# Patient Record
Sex: MALE | Race: WHITE
[De-identification: names, ages, dates, MRNs, and addresses within clinical notes are randomized per-mention and may not be internally consistent; named-entity substitution may affect disease eponyms.]

---

## 2020-08-26 ENCOUNTER — HOSPITAL ENCOUNTER (EMERGENCY)
Dept: HOSPITAL 95 - ER | Age: 80
Discharge: HOME | End: 2020-08-26
Payer: OTHER GOVERNMENT

## 2020-08-26 VITALS — HEIGHT: 73 IN | BODY MASS INDEX: 28.49 KG/M2 | WEIGHT: 214.99 LBS

## 2020-08-26 DIAGNOSIS — E11.9: ICD-10-CM

## 2020-08-26 DIAGNOSIS — Z79.899: ICD-10-CM

## 2020-08-26 DIAGNOSIS — Z85.46: ICD-10-CM

## 2020-08-26 DIAGNOSIS — R07.9: Primary | ICD-10-CM

## 2020-08-26 DIAGNOSIS — E78.5: ICD-10-CM

## 2020-08-26 DIAGNOSIS — I10: ICD-10-CM

## 2020-08-26 DIAGNOSIS — Z79.2: ICD-10-CM

## 2020-08-26 DIAGNOSIS — Z91.030: ICD-10-CM

## 2020-08-26 DIAGNOSIS — Z88.5: ICD-10-CM

## 2020-08-26 LAB
ALBUMIN SERPL BCP-MCNC: 3.6 G/DL (ref 3.4–5)
ALBUMIN/GLOB SERPL: 1 {RATIO} (ref 0.8–1.8)
ALT SERPL W P-5'-P-CCNC: 26 U/L (ref 12–78)
ANION GAP SERPL CALCULATED.4IONS-SCNC: 7 MMOL/L (ref 6–16)
AST SERPL W P-5'-P-CCNC: 17 U/L (ref 12–37)
BASOPHILS # BLD AUTO: 0.02 K/MM3 (ref 0–0.23)
BASOPHILS NFR BLD AUTO: 0 % (ref 0–2)
BILIRUB SERPL-MCNC: 0.5 MG/DL (ref 0.1–1)
BUN SERPL-MCNC: 30 MG/DL (ref 8–24)
CALCIUM SERPL-MCNC: 9.4 MG/DL (ref 8.5–10.1)
CHLORIDE SERPL-SCNC: 103 MMOL/L (ref 98–108)
CO2 SERPL-SCNC: 27 MMOL/L (ref 21–32)
CREAT SERPL-MCNC: 1.74 MG/DL (ref 0.6–1.2)
DEPRECATED RDW RBC AUTO: 43.9 FL (ref 35.1–46.3)
EOSINOPHIL # BLD AUTO: 0.24 K/MM3 (ref 0–0.68)
EOSINOPHIL NFR BLD AUTO: 4 % (ref 0–6)
ERYTHROCYTE [DISTWIDTH] IN BLOOD BY AUTOMATED COUNT: 12.7 % (ref 11.7–14.2)
GLOBULIN SER CALC-MCNC: 3.7 G/DL (ref 2.2–4)
GLUCOSE SERPL-MCNC: 280 MG/DL (ref 70–99)
HCT VFR BLD AUTO: 34.3 % (ref 37–53)
HGB BLD-MCNC: 11.8 G/DL (ref 13.5–17.5)
IMM GRANULOCYTES # BLD AUTO: 0.01 K/MM3 (ref 0–0.1)
IMM GRANULOCYTES NFR BLD AUTO: 0 % (ref 0–1)
LYMPHOCYTES # BLD AUTO: 1.13 K/MM3 (ref 0.84–5.2)
LYMPHOCYTES NFR BLD AUTO: 20 % (ref 21–46)
MCHC RBC AUTO-ENTMCNC: 34.4 G/DL (ref 31.5–36.5)
MCV RBC AUTO: 96 FL (ref 80–100)
MONOCYTES # BLD AUTO: 0.59 K/MM3 (ref 0.16–1.47)
MONOCYTES NFR BLD AUTO: 11 % (ref 4–13)
NEUTROPHILS # BLD AUTO: 3.57 K/MM3 (ref 1.96–9.15)
NEUTROPHILS NFR BLD AUTO: 64 % (ref 41–73)
NRBC # BLD AUTO: 0 K/MM3 (ref 0–0.02)
NRBC BLD AUTO-RTO: 0 /100 WBC (ref 0–0.2)
PLATELET # BLD AUTO: 176 K/MM3 (ref 150–400)
POTASSIUM SERPL-SCNC: 4 MMOL/L (ref 3.5–5.5)
PROT SERPL-MCNC: 7.3 G/DL (ref 6.4–8.2)
SODIUM SERPL-SCNC: 137 MMOL/L (ref 136–145)
TROPONIN I SERPL-MCNC: <0.015 NG/ML (ref 0–0.04)

## 2022-02-16 ENCOUNTER — HOSPITAL ENCOUNTER (INPATIENT)
Dept: HOSPITAL 95 - ER | Age: 82
LOS: 3 days | Discharge: HOME | DRG: 872 | End: 2022-02-19
Attending: HOSPITALIST | Admitting: INTERNAL MEDICINE
Payer: OTHER GOVERNMENT

## 2022-02-16 VITALS — HEIGHT: 73 IN | WEIGHT: 200 LBS | BODY MASS INDEX: 26.51 KG/M2

## 2022-02-16 DIAGNOSIS — A41.51: Primary | ICD-10-CM

## 2022-02-16 DIAGNOSIS — Z66: ICD-10-CM

## 2022-02-16 DIAGNOSIS — M19.90: ICD-10-CM

## 2022-02-16 DIAGNOSIS — Z79.02: ICD-10-CM

## 2022-02-16 DIAGNOSIS — R47.01: ICD-10-CM

## 2022-02-16 DIAGNOSIS — N39.0: ICD-10-CM

## 2022-02-16 DIAGNOSIS — R53.83: ICD-10-CM

## 2022-02-16 DIAGNOSIS — E11.22: ICD-10-CM

## 2022-02-16 DIAGNOSIS — E83.39: ICD-10-CM

## 2022-02-16 DIAGNOSIS — E78.5: ICD-10-CM

## 2022-02-16 DIAGNOSIS — J32.8: ICD-10-CM

## 2022-02-16 DIAGNOSIS — Z79.82: ICD-10-CM

## 2022-02-16 DIAGNOSIS — Z98.890: ICD-10-CM

## 2022-02-16 DIAGNOSIS — Z85.828: ICD-10-CM

## 2022-02-16 DIAGNOSIS — I12.9: ICD-10-CM

## 2022-02-16 DIAGNOSIS — Z85.46: ICD-10-CM

## 2022-02-16 DIAGNOSIS — N18.30: ICD-10-CM

## 2022-02-16 DIAGNOSIS — J20.9: ICD-10-CM

## 2022-02-16 DIAGNOSIS — Z90.79: ICD-10-CM

## 2022-02-16 DIAGNOSIS — Z79.4: ICD-10-CM

## 2022-02-16 LAB
ALBUMIN SERPL BCP-MCNC: 3.7 G/DL (ref 3.4–5)
ALBUMIN/GLOB SERPL: 0.8 {RATIO} (ref 0.8–1.8)
ALT SERPL W P-5'-P-CCNC: 27 U/L (ref 12–78)
ANION GAP SERPL CALCULATED.4IONS-SCNC: 7 MMOL/L (ref 6–16)
AST SERPL W P-5'-P-CCNC: 30 U/L (ref 12–37)
BASOPHILS # BLD AUTO: 0.05 K/MM3 (ref 0–0.23)
BASOPHILS NFR BLD AUTO: 0 % (ref 0–2)
BILIRUB SERPL-MCNC: 0.7 MG/DL (ref 0.1–1)
BUN SERPL-MCNC: 39 MG/DL (ref 8–24)
CALCIUM SERPL-MCNC: 10 MG/DL (ref 8.5–10.1)
CHLORIDE SERPL-SCNC: 101 MMOL/L (ref 98–108)
CO2 SERPL-SCNC: 27 MMOL/L (ref 21–32)
CREAT SERPL-MCNC: 1.53 MG/DL (ref 0.6–1.2)
DEPRECATED RDW RBC AUTO: 44.8 FL (ref 35.1–46.3)
EOSINOPHIL # BLD AUTO: 0.17 K/MM3 (ref 0–0.68)
EOSINOPHIL NFR BLD AUTO: 1 % (ref 0–6)
ERYTHROCYTE [DISTWIDTH] IN BLOOD BY AUTOMATED COUNT: 13.3 % (ref 11.7–14.2)
GLOBULIN SER CALC-MCNC: 4.8 G/DL (ref 2.2–4)
GLUCOSE SERPL-MCNC: 156 MG/DL (ref 70–99)
GLUCOSE UR-MCNC: (no result) MG/DL
HCT VFR BLD AUTO: 36.1 % (ref 37–53)
HGB BLD-MCNC: 12.3 G/DL (ref 13.5–17.5)
IMM GRANULOCYTES # BLD AUTO: 0.14 K/MM3 (ref 0–0.1)
IMM GRANULOCYTES NFR BLD AUTO: 1 % (ref 0–1)
LEUKOCYTE ESTERASE UR QL STRIP: (no result)
LYMPHOCYTES # BLD AUTO: 0.58 K/MM3 (ref 0.84–5.2)
LYMPHOCYTES NFR BLD AUTO: 3 % (ref 21–46)
MCHC RBC AUTO-ENTMCNC: 34.1 G/DL (ref 31.5–36.5)
MCV RBC AUTO: 92 FL (ref 80–100)
MONOCYTES # BLD AUTO: 1.51 K/MM3 (ref 0.16–1.47)
MONOCYTES NFR BLD AUTO: 7 % (ref 4–13)
NEUTROPHILS # BLD AUTO: 17.99 K/MM3 (ref 1.96–9.15)
NEUTROPHILS NFR BLD AUTO: 88 % (ref 41–73)
NRBC # BLD AUTO: 0 K/MM3 (ref 0–0.02)
NRBC BLD AUTO-RTO: 0 /100 WBC (ref 0–0.2)
PLATELET # BLD AUTO: 363 K/MM3 (ref 150–400)
POTASSIUM SERPL-SCNC: 4.5 MMOL/L (ref 3.5–5.5)
PROT SERPL-MCNC: 8.5 G/DL (ref 6.4–8.2)
PROT UR STRIP-MCNC: (no result) MG/DL
SODIUM SERPL-SCNC: 135 MMOL/L (ref 136–145)
SP GR SPEC: 1.01 (ref 1–1.02)
UROBILINOGEN UR STRIP-MCNC: (no result) MG/DL
WBC #/AREA URNS HPF: (no result) /HPF (ref 0–5)

## 2022-02-16 PROCEDURE — A9270 NON-COVERED ITEM OR SERVICE: HCPCS

## 2022-02-17 LAB
ALBUMIN SERPL BCP-MCNC: 3 G/DL (ref 3.4–5)
ALBUMIN/GLOB SERPL: 0.8 {RATIO} (ref 0.8–1.8)
ALT SERPL W P-5'-P-CCNC: 20 U/L (ref 12–78)
ANION GAP SERPL CALCULATED.4IONS-SCNC: 6 MMOL/L (ref 6–16)
AST SERPL W P-5'-P-CCNC: 17 U/L (ref 12–37)
BASOPHILS # BLD AUTO: 0.05 K/MM3 (ref 0–0.23)
BASOPHILS NFR BLD AUTO: 0 % (ref 0–2)
BILIRUB SERPL-MCNC: 0.8 MG/DL (ref 0.1–1)
BUN SERPL-MCNC: 37 MG/DL (ref 8–24)
CALCIUM SERPL-MCNC: 9 MG/DL (ref 8.5–10.1)
CHLORIDE SERPL-SCNC: 105 MMOL/L (ref 98–108)
CO2 SERPL-SCNC: 27 MMOL/L (ref 21–32)
CREAT SERPL-MCNC: 1.47 MG/DL (ref 0.6–1.2)
DEPRECATED RDW RBC AUTO: 45.1 FL (ref 35.1–46.3)
EOSINOPHIL # BLD AUTO: 0.05 K/MM3 (ref 0–0.68)
EOSINOPHIL NFR BLD AUTO: 0 % (ref 0–6)
ERYTHROCYTE [DISTWIDTH] IN BLOOD BY AUTOMATED COUNT: 13.2 % (ref 11.7–14.2)
GLOBULIN SER CALC-MCNC: 3.9 G/DL (ref 2.2–4)
GLUCOSE SERPL-MCNC: 113 MG/DL (ref 70–99)
HCT VFR BLD AUTO: 31.1 % (ref 37–53)
HGB BLD-MCNC: 10.5 G/DL (ref 13.5–17.5)
IMM GRANULOCYTES # BLD AUTO: 0.2 K/MM3 (ref 0–0.1)
IMM GRANULOCYTES NFR BLD AUTO: 1 % (ref 0–1)
LYMPHOCYTES # BLD AUTO: 0.93 K/MM3 (ref 0.84–5.2)
LYMPHOCYTES NFR BLD AUTO: 4 % (ref 21–46)
MCHC RBC AUTO-ENTMCNC: 33.8 G/DL (ref 31.5–36.5)
MCV RBC AUTO: 94 FL (ref 80–100)
MONOCYTES # BLD AUTO: 1.98 K/MM3 (ref 0.16–1.47)
MONOCYTES NFR BLD AUTO: 9 % (ref 4–13)
NEUTROPHILS # BLD AUTO: 19.92 K/MM3 (ref 1.96–9.15)
NEUTROPHILS NFR BLD AUTO: 86 % (ref 41–73)
NRBC # BLD AUTO: 0 K/MM3 (ref 0–0.02)
NRBC BLD AUTO-RTO: 0 /100 WBC (ref 0–0.2)
PLATELET # BLD AUTO: 309 K/MM3 (ref 150–400)
POTASSIUM SERPL-SCNC: 3.9 MMOL/L (ref 3.5–5.5)
PROT SERPL-MCNC: 6.9 G/DL (ref 6.4–8.2)
SODIUM SERPL-SCNC: 138 MMOL/L (ref 136–145)

## 2022-02-17 NOTE — NUR
SUMMARY
 
PT SITTING UP IN BED WATCHING TV, PT HAS BEEN PLEASANT AND COOPERATIVE T/O THE
CARE, [place], UP WITH MIN ASSIST, USES THE URINAL AT THE BEDSIDE, NO COMPLAINTS,
VSS, WILL CONT TO MONITOR

## 2022-02-17 NOTE — NUR
ADMISSION NOTE/SHIFT SUMMARY
PATIENT ADMITTED TO MEDICAL FLOOR AOX4 ABLE TO PARTICIPATE IN ADMISSION
ASSESSMENT PLEASANT MOOD [place] OWN TEETH INTACT.SOME REDNESS TO HIS COOCXY ABD
SOFT NONTENDER WITH BOWEL SOUNDS X 4 QUADS.LUNGS SOUMDS CLEAR MURRAY GLENNA PAIN
AT THIS MOMENT. PT DNR VERIFIED WITH ALEXSANDRA RN PURPTLE BAND PLACED TO LEFT
WRIST.NS RUNNING AT 100ML/HR.STANDBY ASSIST WITH TRANSFERS .

## 2022-02-18 LAB
ALBUMIN SERPL BCP-MCNC: 3.2 G/DL (ref 3.4–5)
ANION GAP SERPL CALCULATED.4IONS-SCNC: 6 MMOL/L (ref 6–16)
BUN SERPL-MCNC: 31 MG/DL (ref 8–24)
CALCIUM SERPL-MCNC: 9.3 MG/DL (ref 8.5–10.1)
CHLORIDE SERPL-SCNC: 104 MMOL/L (ref 98–108)
CO2 SERPL-SCNC: 27 MMOL/L (ref 21–32)
CREAT SERPL-MCNC: 1.47 MG/DL (ref 0.6–1.2)
DEPRECATED RDW RBC AUTO: 46 FL (ref 35.1–46.3)
ERYTHROCYTE [DISTWIDTH] IN BLOOD BY AUTOMATED COUNT: 13.2 % (ref 11.7–14.2)
GLUCOSE SERPL-MCNC: 152 MG/DL (ref 70–99)
HCT VFR BLD AUTO: 34.3 % (ref 37–53)
HGB BLD-MCNC: 11.2 G/DL (ref 13.5–17.5)
MCHC RBC AUTO-ENTMCNC: 32.7 G/DL (ref 31.5–36.5)
MCV RBC AUTO: 96 FL (ref 80–100)
NRBC # BLD AUTO: 0 K/MM3 (ref 0–0.02)
NRBC BLD AUTO-RTO: 0 /100 WBC (ref 0–0.2)
PHOSPHATE SERPL-MCNC: 2.4 MG/DL (ref 2.5–4.9)
PLATELET # BLD AUTO: 271 K/MM3 (ref 150–400)
POTASSIUM SERPL-SCNC: 3.8 MMOL/L (ref 3.5–5.5)
SODIUM SERPL-SCNC: 137 MMOL/L (ref 136–145)

## 2022-02-18 NOTE — NUR
SHIFT SUMMARY
PATIENT SITTING IN CHAIR WATCHING TV. A&OX4, [place]. PLEASANT AND COOPERATIVE
WITH CARE. SBA TO RESTROOM TO MANAGE IV POOL. NO SIGNIFICANT EVENT T/O SHIFT.
VSS. WILL CONTINUE TO MONITOR.

## 2022-02-18 NOTE — NUR
NIGHT SHIFT SUMMARY
NO CHANGES FOR AURE OVERNIGHT.  PATIENT IS A PLEASANT ALERT AND ORIENTED
GENTLEMAN WHO  HAD NO COMPLAINTS OF DISCOMFORT OVERNIGHT. TOLERATINE IV FLUIDS
WITHOUT DIFFICULTY.  CALLS APPROPRIATELY

## 2022-02-19 LAB
ANION GAP SERPL CALCULATED.4IONS-SCNC: 6 MMOL/L (ref 6–16)
BUN SERPL-MCNC: 27 MG/DL (ref 8–24)
CALCIUM SERPL-MCNC: 9.1 MG/DL (ref 8.5–10.1)
CHLORIDE SERPL-SCNC: 105 MMOL/L (ref 98–108)
CO2 SERPL-SCNC: 28 MMOL/L (ref 21–32)
CREAT SERPL-MCNC: 1.43 MG/DL (ref 0.6–1.2)
DEPRECATED RDW RBC AUTO: 44.8 FL (ref 35.1–46.3)
ERYTHROCYTE [DISTWIDTH] IN BLOOD BY AUTOMATED COUNT: 13.1 % (ref 11.7–14.2)
GLUCOSE SERPL-MCNC: 85 MG/DL (ref 70–99)
HCT VFR BLD AUTO: 30.6 % (ref 37–53)
HGB BLD-MCNC: 10.1 G/DL (ref 13.5–17.5)
MCHC RBC AUTO-ENTMCNC: 33 G/DL (ref 31.5–36.5)
MCV RBC AUTO: 95 FL (ref 80–100)
NRBC # BLD AUTO: 0 K/MM3 (ref 0–0.02)
NRBC BLD AUTO-RTO: 0 /100 WBC (ref 0–0.2)
PLATELET # BLD AUTO: 275 K/MM3 (ref 150–400)
POTASSIUM SERPL-SCNC: 3.7 MMOL/L (ref 3.5–5.5)
SODIUM SERPL-SCNC: 139 MMOL/L (ref 136–145)

## 2022-02-19 NOTE — NUR
PT AWAKE SITTING UP TO EOB AT START OF SHIFT. A&O, PLEASANT AND CO-OP. SBA TO
BTHRM WITH IV PUMP. PT UP TO SHOWER AFTER BREAKFAST. DR BLANKENSHIP IN TO SEE PT
AND DISCUSS PLAN OF CARE. PT ABLE TO D/C TO HOME AND CONTINUE WITH PO ABX. D/C
ORDERS PLACED. MEDS FAXED TO [facility] PER PT REQUEST [facility]OS[facility] TODAY. IV SITES D/C'D WNL'S. D/C INSTRUCTIONS REVIEWED WITH PT. PT
CALLED WIFE AND NEIGHBOR FOR P/U. ASSISTED OUT TO CAR VIA W/C.

## 2022-02-19 NOTE — NUR
SUMMARY
Received patient AAOX3, no complaint of pain or disconfort voices. Patient is
independent in the room. He is at room air. He is still receiving NS AT
100ML/HR. He is Pleasant and cooperative with care. No acute events during
this shift. We will continue to monitor patient until report given to the
oncoming nurse.

## 2022-08-04 ENCOUNTER — HOSPITAL ENCOUNTER (EMERGENCY)
Dept: HOSPITAL 95 - ER | Age: 82
Discharge: HOME | End: 2022-08-04
Payer: OTHER GOVERNMENT

## 2022-08-04 VITALS — HEIGHT: 73 IN | BODY MASS INDEX: 31.14 KG/M2 | WEIGHT: 234.99 LBS

## 2022-08-04 DIAGNOSIS — Z88.5: ICD-10-CM

## 2022-08-04 DIAGNOSIS — Z95.0: ICD-10-CM

## 2022-08-04 DIAGNOSIS — Z79.4: ICD-10-CM

## 2022-08-04 DIAGNOSIS — E78.5: ICD-10-CM

## 2022-08-04 DIAGNOSIS — Z79.899: ICD-10-CM

## 2022-08-04 DIAGNOSIS — I10: ICD-10-CM

## 2022-08-04 DIAGNOSIS — R07.9: Primary | ICD-10-CM

## 2022-08-04 DIAGNOSIS — E11.9: ICD-10-CM

## 2022-08-04 DIAGNOSIS — Z79.82: ICD-10-CM

## 2022-08-04 DIAGNOSIS — Z91.030: ICD-10-CM

## 2022-08-04 LAB
ALBUMIN SERPL BCP-MCNC: 4.2 G/DL (ref 3.4–5)
ALBUMIN/GLOB SERPL: 1.1 {RATIO} (ref 0.8–1.8)
ALT SERPL W P-5'-P-CCNC: 25 U/L (ref 12–78)
ANION GAP SERPL CALCULATED.4IONS-SCNC: 5 MMOL/L (ref 6–16)
AST SERPL W P-5'-P-CCNC: 17 U/L (ref 12–37)
BASOPHILS # BLD AUTO: 0.03 K/MM3 (ref 0–0.23)
BASOPHILS NFR BLD AUTO: 0 % (ref 0–2)
BILIRUB SERPL-MCNC: 0.8 MG/DL (ref 0.1–1)
BUN SERPL-MCNC: 35 MG/DL (ref 8–24)
CALCIUM SERPL-MCNC: 9.9 MG/DL (ref 8.5–10.1)
CHLORIDE SERPL-SCNC: 107 MMOL/L (ref 98–108)
CO2 SERPL-SCNC: 25 MMOL/L (ref 21–32)
CREAT SERPL-MCNC: 1.66 MG/DL (ref 0.6–1.2)
DEPRECATED RDW RBC AUTO: 46.1 FL (ref 35.1–46.3)
EOSINOPHIL # BLD AUTO: 0.17 K/MM3 (ref 0–0.68)
EOSINOPHIL NFR BLD AUTO: 3 % (ref 0–6)
ERYTHROCYTE [DISTWIDTH] IN BLOOD BY AUTOMATED COUNT: 13.6 % (ref 11.7–14.2)
GLOBULIN SER CALC-MCNC: 3.9 G/DL (ref 2.2–4)
GLUCOSE SERPL-MCNC: 144 MG/DL (ref 70–99)
HCT VFR BLD AUTO: 37.3 % (ref 37–53)
HGB BLD-MCNC: 12.7 G/DL (ref 13.5–17.5)
IMM GRANULOCYTES # BLD AUTO: 0.04 K/MM3 (ref 0–0.1)
IMM GRANULOCYTES NFR BLD AUTO: 1 % (ref 0–1)
LYMPHOCYTES # BLD AUTO: 1 K/MM3 (ref 0.84–5.2)
LYMPHOCYTES NFR BLD AUTO: 15 % (ref 21–46)
MCHC RBC AUTO-ENTMCNC: 34 G/DL (ref 31.5–36.5)
MCV RBC AUTO: 93 FL (ref 80–100)
MONOCYTES # BLD AUTO: 0.73 K/MM3 (ref 0.16–1.47)
MONOCYTES NFR BLD AUTO: 11 % (ref 4–13)
NEUTROPHILS # BLD AUTO: 4.78 K/MM3 (ref 1.96–9.15)
NEUTROPHILS NFR BLD AUTO: 71 % (ref 41–73)
NRBC # BLD AUTO: 0 K/MM3 (ref 0–0.02)
NRBC BLD AUTO-RTO: 0 /100 WBC (ref 0–0.2)
PLATELET # BLD AUTO: 292 K/MM3 (ref 150–400)
POTASSIUM SERPL-SCNC: 4.7 MMOL/L (ref 3.5–5.5)
PROT SERPL-MCNC: 8.1 G/DL (ref 6.4–8.2)
SODIUM SERPL-SCNC: 137 MMOL/L (ref 136–145)